# Patient Record
Sex: FEMALE | Race: BLACK OR AFRICAN AMERICAN | NOT HISPANIC OR LATINO | Employment: STUDENT | URBAN - METROPOLITAN AREA
[De-identification: names, ages, dates, MRNs, and addresses within clinical notes are randomized per-mention and may not be internally consistent; named-entity substitution may affect disease eponyms.]

---
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
## 2023-03-30 ENCOUNTER — HOSPITAL ENCOUNTER (EMERGENCY)
Facility: HOSPITAL | Age: 18
Discharge: HOME | End: 2023-03-30
Attending: EMERGENCY MEDICINE

## 2023-03-30 VITALS
RESPIRATION RATE: 18 BRPM | DIASTOLIC BLOOD PRESSURE: 83 MMHG | HEART RATE: 84 BPM | OXYGEN SATURATION: 99 % | SYSTOLIC BLOOD PRESSURE: 141 MMHG | TEMPERATURE: 97.9 F

## 2023-03-30 DIAGNOSIS — M79.674 GREAT TOE PAIN, RIGHT: Primary | ICD-10-CM

## 2023-03-30 RX ORDER — GINSENG 100 MG
1 CAPSULE ORAL 2 TIMES DAILY
Qty: 28 G | Refills: 0 | Status: SHIPPED | OUTPATIENT
Start: 2023-03-30

## 2023-03-30 RX ORDER — GINSENG 100 MG
1 CAPSULE ORAL ONCE
Status: DISCONTINUED | OUTPATIENT
Start: 2023-03-30 | End: 2023-03-30 | Stop reason: HOSPADM

## 2023-03-30 NOTE — ED PROVIDER NOTES
History  Chief Complaint   Patient presents with   • Toe Pain     Pt just woke up with right toe pain, no injury     Patient is a 49-year-old female no past medical history presenting to the ED for evaluation of gradual onset right great big toe pain x1 day  Patient reports that 7 hours prior to arrival she had onset of pain to the lateral aspect of the right great big toe with pain with ambulation  She states that this happened once before a year ago where she had an ingrown toenail however she is unable to recall if it was the left or her right great toe  She states that she has not cut the nail  Denies any trauma to the area  Denies fever chills drainage from the toe or any other complaints at this time  Has not taken any medications          None       History reviewed  No pertinent past medical history  History reviewed  No pertinent surgical history  History reviewed  No pertinent family history  I have reviewed and agree with the history as documented  E-Cigarette/Vaping     E-Cigarette/Vaping Substances     Social History     Tobacco Use   • Smoking status: Never   Substance Use Topics   • Alcohol use: Not Currently   • Drug use: Not Currently        Review of Systems   Constitutional: Negative for chills and fever  Musculoskeletal:        Right great toe pain       Physical Exam  ED Triage Vitals   Temperature Pulse Respirations Blood Pressure SpO2   03/30/23 0358 03/30/23 0400 03/30/23 0358 03/30/23 0358 03/30/23 0400   97 9 °F (36 6 °C) 84 18 141/83 99 %      Temp Source Heart Rate Source Patient Position - Orthostatic VS BP Location FiO2 (%)   03/30/23 0358 03/30/23 0400 -- -- --   Oral Monitor         Pain Score       03/30/23 0359       7             Orthostatic Vital Signs  Vitals:    03/30/23 0358 03/30/23 0400   BP: 141/83    Pulse:  84       Physical Exam  Vitals and nursing note reviewed  Constitutional:       General: She is not in acute distress       Appearance: Normal appearance  She is obese  She is not ill-appearing  HENT:      Head: Normocephalic and atraumatic  Mouth/Throat:      Mouth: Mucous membranes are moist    Eyes:      Extraocular Movements: Extraocular movements intact  Musculoskeletal:         General: Normal range of motion  Cervical back: Normal range of motion  Feet:    Skin:     General: Skin is warm and dry  Neurological:      General: No focal deficit present  Mental Status: She is alert and oriented to person, place, and time  ED Medications  Medications   bacitracin topical ointment 1 small application (1 small application Topical Not Given 3/30/23 0519)       Diagnostic Studies  Results Reviewed     None                 No orders to display         Procedures  Procedures      ED Course                                       Medical Decision Making  Patient is a 20-year-old female presenting for evaluation of right great toe pain  Galvez consistent with mild cellulitis there is no ingrown toenail or abscess does not appear to be gout  Patient is able to ambulate cap refill is normal no focal deficits patient is able to move the toe ambulate without difficulty no drainage or fluctuance  Plan bacitracin podiatry follow-up  Patient is cleared for discharge with outpatient follow-up precautions given patient verbalized understanding    Risk  OTC drugs  Disposition  Final diagnoses:   Great toe pain, right     Time reflects when diagnosis was documented in both MDM as applicable and the Disposition within this note     Time User Action Codes Description Comment    3/30/2023  5:23 AM Subhash Vera Add [B23 [id number]] Great toe pain, right       ED Disposition     ED Disposition   Discharge    Condition   Stable    Date/Time   Thu Mar 30, 2023  5:23 AM    Comment   Dominic Mcpherson discharge to home/self care                 Follow-up Information     Follow up With Specialties Details Why Contact Info Additional Information    [facility] Call in 2 days  [address] [id number]  [address], [address] [place], [place], [address] [facility]          Discharge Medication List as of 3/30/2023  5:24 AM      START taking these medications    Details   bacitracin topical ointment 500 units/g topical ointment Apply 1 large application topically 2 (two) times a day, Starting Thu 3/30/2023, Normal           No discharge procedures on file  PDMP Review     None           ED Provider  Attending physically available and evaluated [facility] managed the patient along with the ED Attending      Electronically Signed by         Beau Torre DO  03/30/23 3030

## 2023-03-30 NOTE — ED ATTENDING ATTESTATION
3/30/2023  IBenjamin MD, saw and evaluated the patient  I have discussed the patient with the resident/non-physician practitioner and agree with the resident's/non-physician practitioner's findings, Plan of Care, and MDM as documented in the resident's/non-physician practitioner's note, except where noted  All available labs and Radiology studies were reviewed  I was present for key portions of any procedure(s) performed by the resident/non-physician practitioner and I was immediately available to provide assistance  At this point I agree with the current assessment done in the Emergency Department  I have conducted an independent evaluation of this patient a history and physical is as follows:    ED Course  ED Course as of 03/30/23 0553   u Mar 30, 2023   0410 Per resident h&p 20 YO F presents for abrupt onset R 1st toe pain; atraumatic; O; 1st toe no fluctuance; no evidence of abscess I/P acute 1st toe pain     Emergency Department Note- Blaire Figueroa 19 y o  female MRN: [id number]    Unit/Bed#: Z2OA Encounter: [id number]    Blaire Figueroa is a 19 y o  female who presents with   Chief Complaint   Patient presents with   • Toe Pain     Pt just woke up with right toe pain, no injury         History of Present Illness   HPI:  Blaire Figueroa is a 19 y o  female who presents for evaluation of:  Abrupt onset of distal right toe pain adjacent to the nailbed  Patient denies any trauma to the area  The patient woke up with this discomfort this morning  Patient denies recently cutting her toenails  Patient denies any drainage from the distal periungual area  Patient denies any associated pain in the metatarsal phalangeal joint area  Review of Systems   Constitutional: Negative for fatigue and fever  HENT: Negative for congestion and sore throat  Respiratory: Negative for cough and shortness of breath  Cardiovascular: Negative for chest pain and palpitations  Gastrointestinal: Negative for abdominal pain and nausea  Genitourinary: Negative for flank pain and frequency  Neurological: Negative for light-headedness and headaches  Psychiatric/Behavioral: Negative for dysphoric mood and hallucinations  All other systems reviewed and are negative  Historical Information   History reviewed  No pertinent past medical history  History reviewed  No pertinent surgical history  Social History   Social History     Substance and Sexual Activity   Alcohol Use Not Currently     Social History     Substance and Sexual Activity   Drug Use Not Currently     Social History     Tobacco Use   Smoking Status Never   Smokeless Tobacco Not on file     Family History: History reviewed  No pertinent family history  Meds/Allergies   PTA meds:   None     No Known Allergies    Objective   First Vitals:   Blood Pressure: 141/83 (03/30/23 0358)  Pulse: 84 (03/30/23 0400)  Temperature: 97 9 °F (36 6 °C) (03/30/23 0358)  Temp Source: Oral (03/30/23 0358)  Respirations: 18 (03/30/23 0358)  SpO2: 99 % (03/30/23 0400)    Current Vitals:   Blood Pressure: 141/83 (03/30/23 0358)  Pulse: 84 (03/30/23 0400)  Temperature: 97 9 °F (36 6 °C) (03/30/23 0358)  Temp Source: Oral (03/30/23 0358)  Respirations: 18 (03/30/23 0358)  SpO2: 99 % (03/30/23 0400)    No intake or output data in the 24 hours ending 03/30/23 0553    Invasive Devices     None                 Physical Exam  Vitals and nursing note reviewed  Constitutional:       General: She is not in acute distress  Appearance: Normal appearance  She is well-developed  HENT:      Head: Normocephalic and atraumatic  Right Ear: External ear normal       Left Ear: External ear normal       Nose: Nose normal       Mouth/Throat:      Pharynx: No oropharyngeal exudate  Eyes:      Conjunctiva/sclera: Conjunctivae normal       Pupils: Pupils are equal, round, and reactive to light     Cardiovascular:      Rate and Rhythm: Normal rate "and regular rhythm  Pulmonary:      Effort: Pulmonary effort is normal  No respiratory distress  Abdominal:      General: Abdomen is flat  There is no distension  Palpations: Abdomen is soft  Musculoskeletal:         General: No swelling, tenderness, deformity or signs of injury  Normal range of motion  Cervical back: Normal range of motion and neck supple  Right lower leg: No edema  Left lower leg: No edema  Skin:     General: Skin is warm and dry  Capillary Refill: Capillary refill takes less than 2 seconds  Findings: No erythema  Neurological:      General: No focal deficit present  Mental Status: She is alert and oriented to person, place, and time  Mental status is at baseline  Coordination: Coordination normal    Psychiatric:         Mood and Affect: Mood normal          Behavior: Behavior normal          Thought Content: Thought content normal          Judgment: Judgment normal            Medical Decision Makin  Right distal toe discomfort: No evidence of paronychia; bruising; ingrown toenail; metatarsophalangeal swelling or inflammation  Recommend warm soaks to distal toe; bacitracin ointment  Follow-up with podiatry  No results found for this or any previous visit (from the past 36 hour(s))  No orders to display         Portions of the record may have been created with voice recognition software  Occasional wrong word or \"sound a like\" substitutions may have occurred due to the inherent limitations of voice recognition software  Read the chart carefully and recognize, using context, where substitutions have occurred          Critical Care Time  Procedures    "